# Patient Record
Sex: MALE | Race: WHITE
[De-identification: names, ages, dates, MRNs, and addresses within clinical notes are randomized per-mention and may not be internally consistent; named-entity substitution may affect disease eponyms.]

---

## 2019-03-08 ENCOUNTER — HOSPITAL ENCOUNTER (EMERGENCY)
Dept: HOSPITAL 97 - ER | Age: 29
LOS: 1 days | Discharge: ANOTHER HOSPITAL | End: 2019-03-09
Payer: SELF-PAY

## 2019-03-08 DIAGNOSIS — S06.6X9A: ICD-10-CM
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
## 2019-03-08 DIAGNOSIS — F41.9: ICD-10-CM

## 2019-03-08 DIAGNOSIS — S06.5X9A: Primary | ICD-10-CM

## 2019-03-08 PROCEDURE — 99285 EMERGENCY DEPT VISIT HI MDM: CPT

## 2019-03-08 PROCEDURE — 70450 CT HEAD/BRAIN W/O DYE: CPT

## 2019-03-08 PROCEDURE — 51702 INSERT TEMP BLADDER CATH: CPT

## 2019-03-08 PROCEDURE — 36415 COLL VENOUS BLD VENIPUNCTURE: CPT

## 2019-03-08 PROCEDURE — 90714 TD VACC NO PRESV 7 YRS+ IM: CPT

## 2019-03-08 PROCEDURE — 71045 X-RAY EXAM CHEST 1 VIEW: CPT

## 2019-03-08 PROCEDURE — 31500 INSERT EMERGENCY AIRWAY: CPT

## 2019-03-08 PROCEDURE — 80048 BASIC METABOLIC PNL TOTAL CA: CPT

## 2019-03-08 PROCEDURE — 85025 COMPLETE CBC W/AUTO DIFF WBC: CPT

## 2019-03-08 PROCEDURE — 94002 VENT MGMT INPAT INIT DAY: CPT

## 2019-03-08 PROCEDURE — 72125 CT NECK SPINE W/O DYE: CPT

## 2019-03-09 LAB
BUN BLD-MCNC: 16 MG/DL (ref 7–18)
GLUCOSE SERPLBLD-MCNC: 133 MG/DL (ref 74–106)
HCT VFR BLD CALC: 45.6 % (ref 39.6–49)
LYMPHOCYTES # SPEC AUTO: 4.2 K/UL (ref 0.7–4.9)
PMV BLD: 7.7 FL (ref 7.6–11.3)
POTASSIUM SERPL-SCNC: 3.3 MMOL/L (ref 3.5–5.1)
RBC # BLD: 5.23 M/UL (ref 4.33–5.43)

## 2019-03-09 PROCEDURE — 0BH17EZ INSERTION OF ENDOTRACHEAL AIRWAY INTO TRACHEA, VIA NATURAL OR ARTIFICIAL OPENING: ICD-10-PCS
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
## 2019-03-11 NOTE — RAD REPORT
EXAM DESCRIPTION:  CT - Head C Spine Mpr Wo Con - 3/9/2019 1:14 am

 

CLINICAL HISTORY:  28 years Male, tube placement

 

COMPARISON:  None.

 

TECHNIQUE:  Single portable view of the chest performed on 3/9/2019 at 1:18 AM

 

FINDINGS:  The lungs are well expanded and are clear. There is no evidence of a pneumothorax.

The cardiac silhouette is normal in size and configuration.

The mediastinal contours are normal.

No acute osseous abnormality is identified.

No focal soft tissue abnormalities are seen.

Lines and tubes:   The tip of the endotracheal tube terminates along the inferior clavicular heads. T
he feeding tube extends below the diaphragm and appears to terminate in the left upper quadrant in th
e region of the stomach.

 

IMPRESSION:  1. No evidence of acute intrathoracic disease.

2. Endotracheal tube and feeding tube in grossly satisfactory position.

 

Electronically signed by:   Chrissie Durant DO   3/9/2019 1:37 AM CST Workstation: [id number]

 

 

Due to temporary technical issues with the PACS/Fluency reporting system, reports are being signed by
 the in house radiologist as a courtesy to ensure prompt reporting. The interpreting radiologist is f
ully responsible for the content of the report.

## 2019-03-11 NOTE — RAD REPORT
EXAM DESCRIPTION:  RAD - Chest Single View - 3/9/2019 1:25 am

 

CLINICAL HISTORY:  28 years Male, tube placement

 

COMPARISON:  None.

 

TECHNIQUE:  Single portable view of the chest performed on 3/9/2019 at 1:18 AM

 

FINDINGS:  The lungs are well expanded and are clear. There is no evidence of a pneumothorax.

The cardiac silhouette is normal in size and configuration.

The mediastinal contours are normal.

No acute osseous abnormality is identified.

No focal soft tissue abnormalities are seen.

Lines and tubes:   The tip of the endotracheal tube terminates along the inferior clavicular heads. T
he feeding tube extends below the diaphragm and appears to terminate in the left upper quadrant in th
e region of the stomach.

 

IMPRESSION:  1. No evidence of acute intrathoracic disease.

2. Endotracheal tube and feeding tube in grossly satisfactory position.

 

Electronically signed by:   Chrissie Durant DO   3/9/2019 1:37 AM CST Workstation: [id number]

 

 

Due to temporary technical issues with the PACS/Fluency reporting system, reports are being signed by
 the in house radiologist as a courtesy to ensure prompt reporting. The interpreting radiologist is f
ully responsible for the content of the report.